# Patient Record
Sex: FEMALE | Race: WHITE | URBAN - METROPOLITAN AREA
[De-identification: names, ages, dates, MRNs, and addresses within clinical notes are randomized per-mention and may not be internally consistent; named-entity substitution may affect disease eponyms.]

---

## 2017-01-26 ENCOUNTER — OFFICE VISIT (OUTPATIENT)
Dept: ORTHOPEDIC SURGERY | Age: 47
End: 2017-01-26

## 2017-01-26 VITALS
HEART RATE: 79 BPM | DIASTOLIC BLOOD PRESSURE: 82 MMHG | SYSTOLIC BLOOD PRESSURE: 140 MMHG | WEIGHT: 150 LBS | BODY MASS INDEX: 21.52 KG/M2

## 2017-01-26 DIAGNOSIS — S93.602A FOOT SPRAIN, LEFT, INITIAL ENCOUNTER: ICD-10-CM

## 2017-01-26 DIAGNOSIS — M79.672 FOOT PAIN, LEFT: Primary | ICD-10-CM

## 2017-01-26 PROCEDURE — 73630 X-RAY EXAM OF FOOT: CPT | Performed by: PHYSICIAN ASSISTANT

## 2017-01-26 PROCEDURE — L3260 AMBULATORY SURGICAL BOOT EAC: HCPCS | Performed by: PHYSICIAN ASSISTANT

## 2017-01-26 PROCEDURE — 99203 OFFICE O/P NEW LOW 30 MIN: CPT | Performed by: PHYSICIAN ASSISTANT

## 2017-02-03 ENCOUNTER — OFFICE VISIT (OUTPATIENT)
Dept: ORTHOPEDIC SURGERY | Age: 47
End: 2017-02-03

## 2017-02-03 VITALS
SYSTOLIC BLOOD PRESSURE: 117 MMHG | HEART RATE: 89 BPM | DIASTOLIC BLOOD PRESSURE: 76 MMHG | BODY MASS INDEX: 21.47 KG/M2 | HEIGHT: 70 IN | WEIGHT: 150 LBS

## 2017-02-03 DIAGNOSIS — M84.375A STRESS FRACTURE OF LEFT FOOT, INITIAL ENCOUNTER: Primary | ICD-10-CM

## 2017-02-03 PROCEDURE — 99243 OFF/OP CNSLTJ NEW/EST LOW 30: CPT | Performed by: ORTHOPAEDIC SURGERY

## 2021-05-05 ENCOUNTER — HOSPITAL ENCOUNTER (EMERGENCY)
Age: 51
Discharge: HOME | End: 2021-05-05
Attending: EMERGENCY MEDICINE
Payer: COMMERCIAL

## 2021-05-05 VITALS
DIASTOLIC BLOOD PRESSURE: 83 MMHG | BODY MASS INDEX: 20.08 KG/M2 | WEIGHT: 140.25 LBS | RESPIRATION RATE: 16 BRPM | SYSTOLIC BLOOD PRESSURE: 133 MMHG | HEIGHT: 70 IN | HEART RATE: 71 BPM | OXYGEN SATURATION: 100 % | TEMPERATURE: 98.1 F

## 2021-05-05 DIAGNOSIS — B34.9 VIRAL ILLNESS: ICD-10-CM

## 2021-05-05 DIAGNOSIS — R11.2 NON-INTRACTABLE VOMITING WITH NAUSEA, UNSPECIFIED VOMITING TYPE: Primary | ICD-10-CM

## 2021-05-05 DIAGNOSIS — R03.0 ELEVATED BLOOD PRESSURE READING: ICD-10-CM

## 2021-05-05 LAB
A/G RATIO: 1.7 (ref 1.1–2.2)
ALBUMIN SERPL-MCNC: 4.7 G/DL (ref 3.4–5)
ALP BLD-CCNC: 48 U/L (ref 40–129)
ALT SERPL-CCNC: 8 U/L (ref 10–40)
ANION GAP SERPL CALCULATED.3IONS-SCNC: 13 MMOL/L (ref 3–16)
AST SERPL-CCNC: 14 U/L (ref 15–37)
BACTERIA: ABNORMAL /HPF
BASOPHILS ABSOLUTE: 0.1 K/UL (ref 0–0.2)
BASOPHILS RELATIVE PERCENT: 0.8 %
BILIRUB SERPL-MCNC: 0.7 MG/DL (ref 0–1)
BILIRUBIN URINE: ABNORMAL
BLOOD, URINE: ABNORMAL
BUN BLDV-MCNC: 9 MG/DL (ref 7–20)
CALCIUM SERPL-MCNC: 9.7 MG/DL (ref 8.3–10.6)
CHLORIDE BLD-SCNC: 101 MMOL/L (ref 99–110)
CLARITY: CLEAR
CO2: 27 MMOL/L (ref 21–32)
COLOR: YELLOW
CREAT SERPL-MCNC: <0.5 MG/DL (ref 0.6–1.1)
EOSINOPHILS ABSOLUTE: 0 K/UL (ref 0–0.6)
EOSINOPHILS RELATIVE PERCENT: 0.2 %
EPITHELIAL CELLS, UA: ABNORMAL /HPF (ref 0–5)
GFR AFRICAN AMERICAN: >60
GFR NON-AFRICAN AMERICAN: >60
GLOBULIN: 2.7 G/DL
GLUCOSE BLD-MCNC: 110 MG/DL (ref 70–99)
GLUCOSE URINE: NEGATIVE MG/DL
HCG(URINE) PREGNANCY TEST: NEGATIVE
HCT VFR BLD CALC: 45 % (ref 36–48)
HEMOGLOBIN: 14.7 G/DL (ref 12–16)
HYALINE CASTS: ABNORMAL /LPF (ref 0–2)
KETONES, URINE: >=80 MG/DL
LEUKOCYTE ESTERASE, URINE: NEGATIVE
LIPASE: 61 U/L (ref 13–60)
LYMPHOCYTES ABSOLUTE: 1.4 K/UL (ref 1–5.1)
LYMPHOCYTES RELATIVE PERCENT: 18.4 %
MCH RBC QN AUTO: 31 PG (ref 26–34)
MCHC RBC AUTO-ENTMCNC: 32.7 G/DL (ref 31–36)
MCV RBC AUTO: 94.7 FL (ref 80–100)
MICROSCOPIC EXAMINATION: YES
MONOCYTES ABSOLUTE: 0.7 K/UL (ref 0–1.3)
MONOCYTES RELATIVE PERCENT: 9.5 %
MUCUS: ABNORMAL /LPF
NEUTROPHILS ABSOLUTE: 5.4 K/UL (ref 1.7–7.7)
NEUTROPHILS RELATIVE PERCENT: 71.1 %
NITRITE, URINE: NEGATIVE
PDW BLD-RTO: 12.8 % (ref 12.4–15.4)
PH UA: 5.5 (ref 5–8)
PLATELET # BLD: 268 K/UL (ref 135–450)
PMV BLD AUTO: 9.2 FL (ref 5–10.5)
POTASSIUM REFLEX MAGNESIUM: 3.7 MMOL/L (ref 3.5–5.1)
PROTEIN UA: NEGATIVE MG/DL
RBC # BLD: 4.75 M/UL (ref 4–5.2)
RBC UA: ABNORMAL /HPF (ref 0–4)
SODIUM BLD-SCNC: 141 MMOL/L (ref 136–145)
SPECIFIC GRAVITY UA: >=1.03 (ref 1–1.03)
TOTAL PROTEIN: 7.4 G/DL (ref 6.4–8.2)
TROPONIN: <0.01 NG/ML
URINE TYPE: ABNORMAL
UROBILINOGEN, URINE: 0.2 E.U./DL
WBC # BLD: 7.6 K/UL (ref 4–11)
WBC UA: ABNORMAL /HPF (ref 0–5)

## 2021-05-05 PROCEDURE — 83690 ASSAY OF LIPASE: CPT

## 2021-05-05 PROCEDURE — C9113 INJ PANTOPRAZOLE SODIUM, VIA: HCPCS | Performed by: EMERGENCY MEDICINE

## 2021-05-05 PROCEDURE — 99283 EMERGENCY DEPT VISIT LOW MDM: CPT

## 2021-05-05 PROCEDURE — 2580000003 HC RX 258: Performed by: EMERGENCY MEDICINE

## 2021-05-05 PROCEDURE — 81001 URINALYSIS AUTO W/SCOPE: CPT

## 2021-05-05 PROCEDURE — 84703 CHORIONIC GONADOTROPIN ASSAY: CPT

## 2021-05-05 PROCEDURE — 80053 COMPREHEN METABOLIC PANEL: CPT

## 2021-05-05 PROCEDURE — 85025 COMPLETE CBC W/AUTO DIFF WBC: CPT

## 2021-05-05 PROCEDURE — 6360000002 HC RX W HCPCS: Performed by: EMERGENCY MEDICINE

## 2021-05-05 PROCEDURE — 84484 ASSAY OF TROPONIN QUANT: CPT

## 2021-05-05 PROCEDURE — 96375 TX/PRO/DX INJ NEW DRUG ADDON: CPT

## 2021-05-05 PROCEDURE — 93005 ELECTROCARDIOGRAM TRACING: CPT | Performed by: EMERGENCY MEDICINE

## 2021-05-05 PROCEDURE — 96374 THER/PROPH/DIAG INJ IV PUSH: CPT

## 2021-05-05 RX ORDER — ONDANSETRON 2 MG/ML
4 INJECTION INTRAMUSCULAR; INTRAVENOUS ONCE
Status: COMPLETED | OUTPATIENT
Start: 2021-05-05 | End: 2021-05-05

## 2021-05-05 RX ORDER — PANTOPRAZOLE SODIUM 40 MG/10ML
40 INJECTION, POWDER, LYOPHILIZED, FOR SOLUTION INTRAVENOUS ONCE
Status: COMPLETED | OUTPATIENT
Start: 2021-05-05 | End: 2021-05-05

## 2021-05-05 RX ORDER — KETOROLAC TROMETHAMINE 30 MG/ML
30 INJECTION, SOLUTION INTRAMUSCULAR; INTRAVENOUS ONCE
Status: COMPLETED | OUTPATIENT
Start: 2021-05-05 | End: 2021-05-05

## 2021-05-05 RX ORDER — ONDANSETRON 4 MG/1
4 TABLET, ORALLY DISINTEGRATING ORAL EVERY 8 HOURS PRN
Qty: 12 TABLET | Refills: 1 | Status: SHIPPED | OUTPATIENT
Start: 2021-05-05

## 2021-05-05 RX ORDER — 0.9 % SODIUM CHLORIDE 0.9 %
1000 INTRAVENOUS SOLUTION INTRAVENOUS ONCE
Status: COMPLETED | OUTPATIENT
Start: 2021-05-05 | End: 2021-05-05

## 2021-05-05 RX ADMIN — ONDANSETRON 4 MG: 2 INJECTION INTRAMUSCULAR; INTRAVENOUS at 16:48

## 2021-05-05 RX ADMIN — SODIUM CHLORIDE 1000 ML: 9 INJECTION, SOLUTION INTRAVENOUS at 16:48

## 2021-05-05 RX ADMIN — KETOROLAC TROMETHAMINE 30 MG: 30 INJECTION, SOLUTION INTRAMUSCULAR at 16:48

## 2021-05-05 RX ADMIN — PANTOPRAZOLE SODIUM 40 MG: 40 INJECTION, POWDER, FOR SOLUTION INTRAVENOUS at 16:48

## 2021-05-05 ASSESSMENT — PAIN SCALES - GENERAL
PAINLEVEL_OUTOF10: 7
PAINLEVEL_OUTOF10: 7

## 2021-05-05 NOTE — ED PROVIDER NOTES
TRIAGE CHIEF COMPLAINT:   Chief Complaint   Patient presents with    Emesis    Abdominal Pain       HPI: Cindy Wilkinson is a 48 y.o. female who presents to the emergency department with complaint of onset 2 days ago of nausea followed yesterday by vomiting with frequent episodes of vomiting the last episode being about an hour ago. Denies diarrhea. No hematemesis. She has had chills but no documented fever. Complains of generalized upper abdominal pain for the past 2 days. She does have generalized nonthunderclap headache. Complains of malaise. She has slight myalgias. Denies cough, chest pain or shortness of breath. No UTI symptoms. Denies any known exposure to anyone with similar symptoms. No previous history of gallbladder disease or pancreatitis. She has not had any previous abdominal surgery other than laser endometrial ablation. She is a previous smoker. She drinks alcohol occasionally. REVIEW OF SYSTEMS:   10 systems reviewed. Pertinent positives per HPI. Otherwise noted to be negative. I have reviewed the triage/nursing documentation and agree unless otherwise noted below. PAST MEDICAL HISTORY:   Past Medical History:   Diagnosis Date    Depression         CURRENT MEDICATIONS:   Patient's Medications   New Prescriptions    No medications on file   Previous Medications    PHENYLEPH-PROMETHAZINE-CODEINE (PHENERGAN VC W/CODEINE) 5-6.25-10 MG/5ML SYRP SYRUP    Take 5 mLs by mouth every 4 hours as needed. SERTRALINE (ZOLOFT) 100 MG TABLET    Take 100 mg by mouth daily. Modified Medications    No medications on file   Discontinued Medications    No medications on file        SURGICAL HISTORY:   History reviewed. No pertinent surgical history. FAMILY HISTORY:   History reviewed. No pertinent family history. SOCIAL HISTORY:    reports that she has been smoking cigarettes. She has been smoking about 0.25 packs per day.  She has never used smokeless tobacco. She reports current alcohol use. She reports that she does not use drugs. ALLERGIES: No Known Allergies    PHYSICAL EXAM:  VITAL SIGNS: BP (!) 145/89   Pulse 85   Temp 98.1 °F (36.7 °C) (Oral)   Resp 18   Ht 5' 10\" (1.778 m)   Wt 140 lb 4 oz (63.6 kg)   SpO2 100%   BMI 20.12 kg/m²   Constitutional:  No acute distress, Non-toxic appearance  HENT: Normocephalic, Atraumatic Oropharynx moist, No oral exudates. TMs are normal.  Eyes:  PERRL, EOMI, Conjunctiva normal, No discharge. Neck: No tenderness, Supple, No lymphadenopathy, No stridor. Cardiovascular:  Normal heart rate, Normal rhythm, No murmurs, No rubs, No gallops. Pulmonary/Chest:  Normal breath sounds, No respiratory distress, No wheezing,  Abdomen:   Soft, with bowel sounds present. She has tenderness across the upper abdomen but no lower tenderness. No rebound. No masses, No pulsatile masses  Back:  No tenderness, No CVA tenderness  Extremities:  Normal range of motion, Intact distal pulses, No edema, No tenderness  Neurologic:  Alert & oriented x 3, Speech is clear and appropriate, No upper extremity drift or lower extremity weakness,  Normal sensory function, No facial asymmetry, no truncal or extremity ataxia. Normal gait. Skin:  Warm, Dry, No erythema, No rash  Psychiatric:  Affect normal, Mood normal      EKG:    EKG interpreted by myself. Normal sinus bradycardia rate of 58. Axis is 45. There is no ischemia. Possible right ventricular conduction delay. R wave progression is delayed. No ectopy. QTC is 416.     Radiology:      LAB  Labs Reviewed   COMPREHENSIVE METABOLIC PANEL W/ REFLEX TO MG FOR LOW K - Abnormal; Notable for the following components:       Result Value    Glucose 110 (*)     CREATININE <0.5 (*)     ALT 8 (*)     AST 14 (*)     All other components within normal limits    Narrative:     Performed at:  [facility]) - [facility]  [address],  [address]   Phone [phone]   LIPASE - Abnormal; Notable for the following components:    Lipase 61.0 (*)     All other components within normal limits    Narrative:     Performed at:  [place] [address]   Phone [phone] - Abnormal; Notable for the following components:    Bilirubin Urine MODERATE (*)     Ketones, Urine >=80 (*)     Blood, Urine TRACE-INTACT (*)     All other components within normal limits    Narrative:     Performed at:  [place] [address]   Phone [phone]   MICROSCOPIC URINALYSIS - Abnormal; Notable for the following components:    Mucus, UA 2+ (*)     Bacteria, UA 1+ (*)     All other components within normal limits    Narrative:     Performed at:  [place] [address]   Phone [phone]   CBC WITH AUTO DIFFERENTIAL    Narrative:     Performed at:  [place] [address]   Phone [phone]   PREGNANCY, URINE    Narrative:     Performed at:  [place] [address]   Phone [phone]   TROPONIN    Narrative:     Performed at:  [place] [address]   Phone [phone]       ED COURSE & MEDICAL DECISION MAKING:  Pertinent Labs & Imaging studies reviewed. (See chart for details)  55-year-old female with onset 2 days ago of nausea followed yesterday by vomiting with frequent episodes continuing until an hour ago without diarrhea as well as some generalized upper abdominal pain for the past 2 days. She has had chills but no fever. No UTI symptoms. Complains of generalized nonthunderclap headache. No cough, chest pain or shortness of breath.   No history of gallbladder disease or pancreatitis. No known exposures. She is afebrile with normal heart rate and blood pressure is 145/89. No respiratory distress. Bowel sounds are present. She has tenderness across the upper abdomen but no lower abdominal tenderness. IV fluids were started she. She was given Zofran, Protonix and Toradol. CBC, CMP and lipase were normal.  Troponin was normal.  EKG shows no ischemia or arrhythmia. UCG was negative. Urinalysis was normal with exception of ketones greater than 80. The patient feels better after IV fluids and medications. She is taking oral fluids. Likely this is a viral illness. There is nothing clinically to suggest meningitis, silent coronary ischemia, surgical abdomen, bowel obstruction, pancreatitis, pyelonephritis or ureteral colic. Recommended Zofran for nausea at home along with a short course of Prilosec. Advise increase fluids by mouth and a clear liquid diet for the next 12 hours then gradually advance if symptoms are improving. Advise follow-up with her primary care doctor and return here for worse symptoms. I discussed with Garth Soto the results of the evaluation in the Emergency Department, diagnosis, care, prognosis and the importance of follow-up. The patient is stable for discharge. The patient and/or family are in agreement with the plan and all questions have been answered. Specific discharge instructions were explained, including reasons to return to the emergency department.           (Please note that portions of this note may have been completed with a voice recognition program.  Efforts were made to edit the dictation but occasionally words are mis-transcribed)      FINAL IMPRESSION:  1 --nausea and vomiting  2 --viral illness  3 --elevated blood pressure reading              Kaylan Zuniga MD  05/05/21 [id number]

## 2021-05-05 NOTE — ED NOTES
Patient states she feels improved. Pain is better. Updated on test results.      Marine Rosales RN  05/05/21 9600

## 2021-05-06 LAB
EKG ATRIAL RATE: 58 BPM
EKG DIAGNOSIS: NORMAL
EKG P AXIS: 50 DEGREES
EKG P-R INTERVAL: 156 MS
EKG Q-T INTERVAL: 424 MS
EKG QRS DURATION: 86 MS
EKG QTC CALCULATION (BAZETT): 416 MS
EKG R AXIS: 45 DEGREES
EKG T AXIS: 47 DEGREES
EKG VENTRICULAR RATE: 58 BPM

## 2021-05-06 PROCEDURE — 93010 ELECTROCARDIOGRAM REPORT: CPT | Performed by: INTERNAL MEDICINE
